# Patient Record
(demographics unavailable — no encounter records)

---
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
## 2025-06-02 NOTE — PHYSICAL EXAM
[FreeTextEntry1] : Examination today reveals swelling and tenderness without deformity to the right fifth finger.  He is moving the finger reasonably well at this time slight restriction of full flexion is no instability on right stress  Examination today of x-rays on dad's cell phone [facility] pediatrics right fifth finger revealed a nondisplaced proximal phalanx fracture